# Patient Record
Sex: MALE | Race: WHITE | URBAN - NONMETROPOLITAN AREA
[De-identification: names, ages, dates, MRNs, and addresses within clinical notes are randomized per-mention and may not be internally consistent; named-entity substitution may affect disease eponyms.]

---

## 2023-03-28 ENCOUNTER — OFFICE VISIT (OUTPATIENT)
Dept: URBAN - NONMETROPOLITAN AREA CLINIC 7 | Facility: CLINIC | Age: 28
End: 2023-03-28
Payer: COMMERCIAL

## 2023-03-28 DIAGNOSIS — T15.02XA FOREIGN BODY IN CORNEA, LEFT EYE: ICD-10-CM

## 2023-03-28 DIAGNOSIS — T15.01XA FOREIGN BODY IN CORNEA, RIGHT EYE: Primary | ICD-10-CM

## 2023-03-28 PROCEDURE — 65222 REMOVE FOREIGN BODY FROM EYE: CPT | Performed by: OPTOMETRIST

## 2023-03-28 PROCEDURE — 99204 OFFICE O/P NEW MOD 45 MIN: CPT | Performed by: OPTOMETRIST

## 2023-03-28 NOTE — IMPRESSION/PLAN
Impression: Foreign body in cornea, right eye: T15.01xA. *ofloxacin QID OU. Plan: Removed in office. CPM. ofloxacin QID OU.

## 2023-03-31 ENCOUNTER — OFFICE VISIT (OUTPATIENT)
Dept: URBAN - NONMETROPOLITAN AREA CLINIC 7 | Facility: CLINIC | Age: 28
End: 2023-03-31
Payer: COMMERCIAL

## 2023-03-31 DIAGNOSIS — S05.02XD CORNEAL ABRASION OF LEFT EYE, SUBSEQUENT ENCOUNTER: Primary | ICD-10-CM

## 2023-03-31 DIAGNOSIS — S05.01XD CORNEAL ABRASION OF RIGHT EYE, SUBSEQUENT ENCOUNTER: ICD-10-CM

## 2023-03-31 PROCEDURE — 92012 INTRM OPH EXAM EST PATIENT: CPT | Performed by: OPTOMETRIST

## 2023-03-31 NOTE — IMPRESSION/PLAN
Impression: Corneal abrasion of left eye, subsequent encounter: S05.02xD. Plan: Nearly sealed. Finish ofloxacin. Use AT's Q2H.